# Patient Record
Sex: FEMALE | Race: WHITE | NOT HISPANIC OR LATINO | URBAN - METROPOLITAN AREA
[De-identification: names, ages, dates, MRNs, and addresses within clinical notes are randomized per-mention and may not be internally consistent; named-entity substitution may affect disease eponyms.]

---
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
## 2019-12-27 NOTE — ED ADULT NURSE NOTE - CAS DISCH CONDITION
Contacted [facility]. They will send a refill of her Lupron at this time. It should arrive in 2-3 days.  
Stable